# Patient Record
(demographics unavailable — no encounter records)

---

## 2025-01-28 NOTE — PLAN
[FreeTextEntry1] : 62 year old female presents for routine gyn exam Breast and pelvic exam performed Pap/HPV conducted discussed scheduling colonoscopy- states will follow up, referral given for cologard Pt to schedule pelvic sono  h/o breast Ca- LCIS - followed with breast surgeon - scheduled for breast MRI 2025  Osteoporosis - discussed Ca and Vit D supplements, reports recent hypercalcemia- will f/u with PMD - continue weight bearing exercies - given referral for endocrine  h/o thyroid nodule - US referral given  RTO in 1 year or prn  h/o  Rx given for mammogram and breast sonogram for  *** (last ***)  Advised pt to schedule colonoscopy for  **** (last ****) (Q 5 years)  Advised pt to schedule DEXA bone density for **** (last ****)  (Q 2 years)  Schedule Pelvic sono for **** (last ****)   RTO in 1 year or PRN

## 2025-01-28 NOTE — PHYSICAL EXAM
[Soft] : soft [Non-tender] : non-tender [Non-distended] : non-distended [Examination Of The Breasts] : a normal appearance [No Masses] : no breast masses were palpable [Vulvar Atrophy] : vulvar atrophy [Labia Majora] : normal [Labia Minora] : normal [Atrophy] : atrophy [Normal] : normal [No Tenderness] : no tenderness [Uterine Adnexae] : normal [Nl Sphincter Tone] : normal sphincter tone [FreeTextEntry9] : guaiac neg

## 2025-01-28 NOTE — HISTORY OF PRESENT ILLNESS
[FreeTextEntry1] : 25 [address] 62 year old female  LMP postmenopausal. She presents for an annual gyn exam.  Denies vaginal bleeding, vaginal discharge and vaginitis sxs. Denies abdominal or pelvic pain. She has normal BMs. Denies bloody stool and urinary complaints. She is currently sexually active. Denies FHx of breast, ovarian, uterine or colon cancer. No new medical conditions, medications or surgeries.  Pt w/ osteoporosis reports of daily exercise and weight bearing exercises. Pt additionally reports of starting workout program specific to treating her osteoporosis.  PMHx: Osteoporosis, B/l LCIS s/p lumpectomy s/p tamoxifen x 5 yrs, thyroid nodule GynHx: Fibroid, Hx of HPV infection OBHx:  SHx: Breast bx's, lumpectomy, cataract surgery FMHx: Denies. Pt full genetics negative All: Codeine, Levaquin Med: zyrtec   Preventative Visit: Breast biopsy 2024 - fibrocystic changes PAP Smear: 2023, NILM, HPV not detected.   Bone Density: Osteoporosis improved from last scan, 2023.   Colonoscopy: >5 years ago.

## 2025-02-10 NOTE — REVIEW OF SYSTEMS
[Fever] : fever [Fatigue] : fatigue [Cough] : cough [Dyspnea on Exertion] : dyspnea on exertion [Nausea] : nausea [Diarrhea] : diarrhea [Negative] : Cardiovascular

## 2025-02-12 NOTE — PHYSICAL EXAM
[Normal TMs] : both tympanic membranes were normal [Clear to Auscultation] : lungs were clear to auscultation bilaterally [Normal] : normal rate, regular rhythm, normal S1 and S2 and no murmur heard [No CVA Tenderness] : no CVA  tenderness [No Rash] : no rash [de-identified] : frequent dry cough  [de-identified] : eyelids reddened  [de-identified] : shortness of breath with conversation,

## 2025-02-12 NOTE — PHYSICAL EXAM
[Normal TMs] : both tympanic membranes were normal [Clear to Auscultation] : lungs were clear to auscultation bilaterally [Normal] : normal rate, regular rhythm, normal S1 and S2 and no murmur heard [No CVA Tenderness] : no CVA  tenderness [No Rash] : no rash [de-identified] : frequent dry cough  [de-identified] : eyelids reddened  [de-identified] : shortness of breath with conversation,

## 2025-02-12 NOTE — HISTORY OF PRESENT ILLNESS
[FreeTextEntry8] : 62 year old female presents for acute visit.  States started having symptoms on Wednesday: nausea, diarrhea, high fever (103F) and headache.  Patient was seen at Urgent care- test negative for Flu and Covid, was given Felicita-flu. One day of Tamiflu left.  States symptoms got worse- body aches and constant congestion.  Reports feeling very weak and exerted with activity.  Reports a frequent cough with minimal mucus production- yellow thick.  Has had pneumonia in the past, thinks it could be that.

## 2025-02-12 NOTE — PLAN
[FreeTextEntry1] : 1. Viral illness vs Pneumonia  -respiratory swab ordered  -chest xray ordered.  -Benzonatate sent to pharmacy  -Ondansetron sent to pharm for nausea.  -Advised to stay hydrated, REST when able to. Working from home.  -Will follow up once labs result.